# Patient Record
Sex: FEMALE | Race: WHITE
[De-identification: names, ages, dates, MRNs, and addresses within clinical notes are randomized per-mention and may not be internally consistent; named-entity substitution may affect disease eponyms.]

---

## 2021-08-16 ENCOUNTER — LABORATORY RESULT (OUTPATIENT)
Age: 21
End: 2021-08-16

## 2021-08-16 ENCOUNTER — APPOINTMENT (OUTPATIENT)
Dept: FAMILY MEDICINE | Facility: CLINIC | Age: 21
End: 2021-08-16
Payer: MEDICAID

## 2021-08-16 VITALS
OXYGEN SATURATION: 100 % | HEART RATE: 83 BPM | TEMPERATURE: 98.6 F | SYSTOLIC BLOOD PRESSURE: 120 MMHG | HEIGHT: 58 IN | BODY MASS INDEX: 26.66 KG/M2 | WEIGHT: 127 LBS | DIASTOLIC BLOOD PRESSURE: 70 MMHG

## 2021-08-16 DIAGNOSIS — Z00.00 ENCOUNTER FOR GENERAL ADULT MEDICAL EXAMINATION W/OUT ABNORMAL FINDINGS: ICD-10-CM

## 2021-08-16 DIAGNOSIS — Z02.0 ENCOUNTER FOR EXAMINATION FOR ADMISSION TO EDUCATIONAL INSTITUTION: ICD-10-CM

## 2021-08-16 DIAGNOSIS — Z13.21 ENCOUNTER FOR SCREENING FOR NUTRITIONAL DISORDER: ICD-10-CM

## 2021-08-16 DIAGNOSIS — Z13.220 ENCOUNTER FOR SCREENING FOR LIPOID DISORDERS: ICD-10-CM

## 2021-08-16 DIAGNOSIS — Z82.49 FAMILY HISTORY OF ISCHEMIC HEART DISEASE AND OTHER DISEASES OF THE CIRCULATORY SYSTEM: ICD-10-CM

## 2021-08-16 DIAGNOSIS — Z13.29 ENCOUNTER FOR SCREENING FOR OTHER SUSPECTED ENDOCRINE DISORDER: ICD-10-CM

## 2021-08-16 DIAGNOSIS — Z23 ENCOUNTER FOR IMMUNIZATION: ICD-10-CM

## 2021-08-16 DIAGNOSIS — Z13.1 ENCOUNTER FOR SCREENING FOR DIABETES MELLITUS: ICD-10-CM

## 2021-08-16 PROCEDURE — 36415 COLL VENOUS BLD VENIPUNCTURE: CPT

## 2021-08-16 PROCEDURE — G0444 DEPRESSION SCREEN ANNUAL: CPT | Mod: NC,59

## 2021-08-16 PROCEDURE — 99385 PREV VISIT NEW AGE 18-39: CPT | Mod: 25

## 2021-08-16 PROCEDURE — G0442 ANNUAL ALCOHOL SCREEN 15 MIN: CPT | Mod: NC

## 2021-08-16 NOTE — HISTORY OF PRESENT ILLNESS
[FreeTextEntry1] : NP/CPE [de-identified] : 19 yo female presents for annual physical. No acute complaints. Denies fever, chills, cp, palpitations, sob, nv, heat/cold intolerance, dizziness, melena, hematochezia, muscle weakness, loss of sensation, bowel/bladder incontinence or calf pain.\par

## 2021-08-16 NOTE — ASSESSMENT
[FreeTextEntry1] : Annual physical: f/u routine labwork\par BMI 26: recommend low fat diet, wt loss, exercise, f/u lipid panel\par School exam: f/u quantiferon\par RTC 2 wks

## 2021-08-16 NOTE — HEALTH RISK ASSESSMENT
[Very Good] : ~his/her~  mood as very good [] : No [1 or 2 (0 pts)] : 1 or 2 (0 points) [Never (0 pts)] : Never (0 points) [No] : In the past 12 months have you used drugs other than those required for medical reasons? No [No falls in past year] : Patient reported no falls in the past year [0] : 2) Feeling down, depressed, or hopeless: Not at all (0) [PHQ-2 Negative - No further assessment needed] : PHQ-2 Negative - No further assessment needed [Audit-CScore] : 0 [de-identified] : needs improvement [de-identified] : needs improvement [[id number]] : 0 [HIV test declined] : HIV test declined [Hepatitis C test declined] : Hepatitis C test declined [With Family] : lives with family [Student] : student [Significant Other] : lives with significant other [Sexually Active] : sexually active [High Risk Behavior] : no high risk behavior [Feels Safe at Home] : Feels safe at home [Fully functional (bathing, dressing, toileting, transferring, walking, feeding)] : Fully functional (bathing, dressing, toileting, transferring, walking, feeding) [Fully functional (using the telephone, shopping, preparing meals, housekeeping, doing laundry, using] : Fully functional and needs no help or supervision to perform IADLs (using the telephone, shopping, preparing meals, housekeeping, doing laundry, using transportation, managing medications and managing finances) [Reports changes in hearing] : Reports no changes in hearing [Reports changes in vision] : Reports no changes in vision [Reports changes in dental health] : Reports no changes in dental health

## 2024-12-27 ENCOUNTER — NON-APPOINTMENT (OUTPATIENT)
Age: 24
End: 2024-12-27

## 2025-01-02 ENCOUNTER — APPOINTMENT (OUTPATIENT)
Dept: OBGYN | Facility: CLINIC | Age: 25
End: 2025-01-02
Payer: COMMERCIAL

## 2025-01-02 VITALS
HEIGHT: 58 IN | BODY MASS INDEX: 30.02 KG/M2 | WEIGHT: 143 LBS | DIASTOLIC BLOOD PRESSURE: 83 MMHG | SYSTOLIC BLOOD PRESSURE: 122 MMHG

## 2025-01-02 DIAGNOSIS — Z01.419 ENCOUNTER FOR GYNECOLOGICAL EXAMINATION (GENERAL) (ROUTINE) W/OUT ABNORMAL FINDINGS: ICD-10-CM

## 2025-01-02 DIAGNOSIS — Z30.09 ENCOUNTER FOR OTHER GENERAL COUNSELING AND ADVICE ON CONTRACEPTION: ICD-10-CM

## 2025-01-02 PROCEDURE — 99459 PELVIC EXAMINATION: CPT

## 2025-01-02 PROCEDURE — 99214 OFFICE O/P EST MOD 30 MIN: CPT | Mod: 25

## 2025-01-02 PROCEDURE — 99385 PREV VISIT NEW AGE 18-39: CPT

## 2025-01-02 RX ORDER — NORETHINDRONE ACETATE AND ETHINYL ESTRADIOL AND FERROUS FUMARATE 1MG-20(24)
1-20 KIT ORAL DAILY
Qty: 3 | Refills: 1 | Status: ACTIVE | COMMUNITY
Start: 2025-01-02 | End: 1900-01-01

## 2025-01-06 LAB
C TRACH RRNA SPEC QL NAA+PROBE: NOT DETECTED
CYTOLOGY CVX/VAG DOC THIN PREP: NORMAL
N GONORRHOEA RRNA SPEC QL NAA+PROBE: NOT DETECTED
SOURCE TP AMPLIFICATION: NORMAL

## 2025-04-23 ENCOUNTER — NON-APPOINTMENT (OUTPATIENT)
Age: 25
End: 2025-04-23

## 2025-05-07 ENCOUNTER — APPOINTMENT (OUTPATIENT)
Age: 25
End: 2025-05-07
Payer: COMMERCIAL

## 2025-05-07 VITALS
DIASTOLIC BLOOD PRESSURE: 86 MMHG | HEIGHT: 58 IN | WEIGHT: 140 LBS | BODY MASS INDEX: 29.39 KG/M2 | SYSTOLIC BLOOD PRESSURE: 126 MMHG

## 2025-05-07 DIAGNOSIS — Z30.09 ENCOUNTER FOR OTHER GENERAL COUNSELING AND ADVICE ON CONTRACEPTION: ICD-10-CM

## 2025-05-07 PROCEDURE — 99213 OFFICE O/P EST LOW 20 MIN: CPT

## 2025-06-23 ENCOUNTER — RX RENEWAL (OUTPATIENT)
Age: 25
End: 2025-06-23

## 2025-06-23 RX ORDER — NORETHINDRONE ACETATE AND ETHINYL ESTRADIOL 1MG-20(24)
1-20 KIT ORAL
Qty: 84 | Refills: 1 | Status: ACTIVE | COMMUNITY
Start: 2025-06-23 | End: 1900-01-01